# Patient Record
Sex: FEMALE | Race: WHITE | Employment: UNEMPLOYED | URBAN - METROPOLITAN AREA
[De-identification: names, ages, dates, MRNs, and addresses within clinical notes are randomized per-mention and may not be internally consistent; named-entity substitution may affect disease eponyms.]

---

## 2017-09-25 ENCOUNTER — HOSPITAL ENCOUNTER (OUTPATIENT)
Dept: WOUND CARE | Age: 37
Discharge: HOME | End: 2017-09-25
Payer: MEDICAID

## 2017-09-25 PROCEDURE — 97597 DBRDMT OPN WND 1ST 20 CM/<: CPT

## 2017-09-25 RX ORDER — QUETIAPINE FUMARATE 25 MG/1
50 TABLET, FILM COATED ORAL
COMMUNITY

## 2017-09-25 RX ORDER — GABAPENTIN 300 MG/1
600 CAPSULE ORAL 3 TIMES DAILY
COMMUNITY

## 2017-09-25 RX ORDER — FLUOXETINE HYDROCHLORIDE 40 MG/1
40 CAPSULE ORAL DAILY
COMMUNITY
End: 2018-10-03

## 2017-09-25 RX ORDER — LORATADINE 10 MG/1
10 TABLET ORAL DAILY
COMMUNITY

## 2017-09-25 RX ORDER — HYDROGEN PEROXIDE 3 %
22.3 SOLUTION, NON-ORAL MISCELLANEOUS DAILY
COMMUNITY
End: 2018-10-03

## 2017-09-25 RX ORDER — TRAZODONE HYDROCHLORIDE 50 MG/1
50 TABLET ORAL
COMMUNITY

## 2017-09-25 RX ORDER — HYDROXYZINE PAMOATE 25 MG/1
50 CAPSULE ORAL
COMMUNITY

## 2017-09-25 RX ORDER — ATORVASTATIN CALCIUM 20 MG/1
20 TABLET, FILM COATED ORAL
COMMUNITY

## 2017-09-25 RX ORDER — FLUOXETINE HYDROCHLORIDE 20 MG/1
20 CAPSULE ORAL DAILY
COMMUNITY
End: 2018-10-03

## 2017-09-25 RX ORDER — TRAMADOL HYDROCHLORIDE 50 MG/1
50 TABLET ORAL
COMMUNITY

## 2017-09-25 RX ORDER — BACLOFEN 10 MG/1
20 TABLET ORAL 3 TIMES DAILY
COMMUNITY

## 2017-09-25 RX ORDER — AMOXICILLIN AND CLAVULANATE POTASSIUM 875; 125 MG/1; MG/1
1 TABLET, FILM COATED ORAL 2 TIMES DAILY
COMMUNITY
End: 2017-11-13

## 2017-09-25 RX ORDER — HYDROCHLOROTHIAZIDE 25 MG/1
25 TABLET ORAL DAILY
COMMUNITY

## 2017-09-25 RX ORDER — INSULIN DEGLUDEC 100 U/ML
100 INJECTION, SOLUTION SUBCUTANEOUS 2 TIMES DAILY
COMMUNITY
End: 2018-10-03
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
## 2017-10-02 ENCOUNTER — HOSPITAL ENCOUNTER (OUTPATIENT)
Dept: WOUND CARE | Age: 37
Discharge: HOME | End: 2017-10-02
Payer: MEDICAID

## 2017-10-02 PROCEDURE — 99212 OFFICE O/P EST SF 10 MIN: CPT

## 2017-10-03 NOTE — PROGRESS NOTES
1500 [facility] [place] [address]   WOUND CARE PROGRESS NOTE       Name:  Mu Gómez   MR#:  [id number]   :  1980   Account #:  [de-identified]        Date of Adm:  10/02/2017       SUBJECTIVE: The patient is seen in follow up for necrobiosis lipoidica   diabeticorum. She says that she has been getting by reasonably well   since the last time I saw her, having surprisingly tolerated the   Trental at a full 400mg dose three times a day. She does say that the area of   concern on her leg is still a concern in the sense that there is some   discomfort in the area that looks brown, and some pruritus in the   periwound. There have been no changes in her medications otherwise,   and no interim complaints. OBJECTIVE   GENERAL: She seems a little bit more anxious than she was the last   time through, and perhaps has a little more plethoric facies. VITAL SIGNS: Temperature 98.4, pulse 95 and regular, respiratory rate   is 18, blood pressure is 124/86. EXTREMITIES: As I examined the left lower extremity, the area that is   open is only 0.1 x 0.1 x 0.1. There are actually 2 of these, and they are   both about the same size, and practically speaking epithelialized. I think we   will continue to treat these with Rosie Vital just to allow them to   recover, I think that gentle compression would still be worthwhile. We had a chance to discuss at some length the difficulties in treating   necrobiosis. The pentoxifylline has been well tolerated, so I have   asked her to continue that, and we have opined that since it has been   almost a year since she has used clobetasol, it may be that the area   was getting worse and that just happened \"at the same time\" rather   than \"because of\" the use of the steroids.  We also discussed the   appropriate use of the steroid, that included occlusion and she is   willing to try once again to use clobetasol with Saran Wrap applied   once a day over this area. The periphery of the discoloration (brown   centrally, pink laterally, edging out to normal skin) shows some xerosis   and I think that the ointment or cream in the clobetasol will help with   that, relieving, I hope, the pruritus, whether the discomfort will be   controlled by that or not I do not know. There is a small area that   seems to be new on the more proximal leg, it was there  last week but   she is worried that it is perhaps a little larger. I have suggested using   clobetasol on that as well, but have left the option open that we might   consider intralesional steroids. She pointedly is willing to put up with   the multitude of sticks which would be required to use intralesional   steroids on the rest of this wound, but I would like to put that off for a   bit. We also should probably research the relative expenses of using   hydroxychloroquine or Prograf topically, with both of them being likely   to be  a financial strain for her, but perhaps next on the list of things to   try. I also suggested that if the ulcers heal, it might be that referral to a   dermatologist for more long term care might be an appropriate follow   up. Will see how she looks when she comes back by. I have asked her   to do so in about 2 weeks, knowing that she can get in touch with us   with any questions that might arise in the meantime.          MD Isaiah Monahan   D:  10/02/2017   17:34   T:  10/02/2017   21:24   Job #:  [id number]     Mayra More NP

## 2017-10-16 ENCOUNTER — HOSPITAL ENCOUNTER (OUTPATIENT)
Dept: WOUND CARE | Age: 37
Discharge: HOME | End: 2017-10-16
Payer: MEDICAID

## 2017-10-16 PROCEDURE — 99213 OFFICE O/P EST LOW 20 MIN: CPT

## 2017-10-17 NOTE — PROGRESS NOTES
[address], [address] [place] [address]   WOUND CARE PROGRESS NOTE       Name:  Luz Nunez   MR#:  [id number]   :  1980   Account #:  [de-identified]        Date of Adm:  10/16/2017       DATE: 10/16/2017    SUBJECTIVE: The patient is seen in followup for the breakdown of an   area of necrobiosis lipoidicum diabeticorum. There are 2 tiny open   areas on the left leg that have been causing us some concern, but the   much larger area of the left anterior shin was causing her concern and   a couple of newer spots more proximally on either shin. She says that   other than that she has been getting by reasonably well. More cogently   for our discussion she has been able to use Clobetasol with an   occlusive dressing without making this any worse, so I do not have the   worry that I had had that the Clobetasol had been in some way   counterproductive. She continues to use pentoxifylline and does not   have any difficulty with that or with the Clobetasol. Other medications   have not changed and she has been getting by really pretty well. PHYSICAL EXAMINATION   GENERAL: She is quite bright, alert and interactive. VITAL SIGNS: Include a temperature of 98.2, pulse is 95 and regular,   respiratory rate is 18, blood pressure is 120/76. EXTREMITIES: Examination of the lower extremities shows that there   are 2 tiny wounds on the left shin, one is 1 x 0.2 x 0.2, the other is just   about that same size. These have some yellow exudate in the center of   them which I believe is the [address] [place] [address] Street which is being used. The   necrobiosis itself seems to be much paler than it has been, especially   the erythema around the lateral aspects of it. There is still some   significant ectasia of the superficial blood vessels. The browner   central areas do not seem to be so tender as they were, but still have   some component of pruritus to them.       What I proposed is that we continue with what we have got. Clobetasol   with occlusive dressings, Medihoney to the 2 ulcers. Continued   pentoxifylline. She is rather aggressively interested in trying to treat this   to make sure that no new lesions appear and with that in mind, she is   specifically concerned about two small areas on the proximal left leg   and the proximal right leg. I propose that I can write a prescription for   Kenalog with the thought that a small injection of Kenalog (10 mg per   mL) might help abort the development of these lesions. Depending   upon our success with that, we could certainly consider treating the   larger area with that unless and until the topical Clobetasol is doing as   good a job. We discussed at some length the paucity and poor quality   of studies treating necrobiosis lipoidicum and she understands the   \"another whack at it\" approach to the therapy.               MD LUCY Gutierrez / Patricio Alejandre   D:  10/16/2017   22:22   T:  10/17/2017   07:02   Job #:  [id number]     Darío Govea NP

## 2017-10-23 ENCOUNTER — HOSPITAL ENCOUNTER (OUTPATIENT)
Dept: WOUND CARE | Age: 37
Discharge: HOME | End: 2017-10-23
Payer: MEDICAID

## 2017-10-23 PROCEDURE — 97597 DBRDMT OPN WND 1ST 20 CM/<: CPT

## 2017-10-24 NOTE — PROGRESS NOTES
[facility] [place] [address]   WOUND CARE PROGRESS NOTE       Name:  Deb Gutierrez   MR#:  [id number]   :  1980   Account #:  [de-identified]        Date of Adm:  10/23/2017       DATE OF SERVICE:     SUBJECTIVE: The patient is seen in followup for her necrobiosis   lipoidica diabeticorum. Since I saw her the last time, she thinks that she   has been getting by reasonably well. There has a little bit of increasing   discomfort feeling like \"the skin is being pulled apart. \" On other   accounts, there has not been much difficulty, except that when she   talked to her pharmacy, they had some difficulty with trying to supply   her Kenalog-10. Preauthorization seems to have been required, and   they need a OLMAN number, as well as my input otherwise, for the use of   this steroid. She and I discussed this at some length, and we are   agreed that we will go ahead with attempts to try to get the medication   for her. On other accounts, she is actually getting by reasonably well,   has not had any intercurrent difficulties, is eating quite well, and has   been able to control her diabetes pretty reasonably, as far as she   knows. OBJECTIVE: Her temperature is 98.5, pulse rate is mildly elevated at   97, respiratory rate is 16 and blood pressure is 131/84. As we examine the lower extremities, right and left leg both have small   areas that are mildly pink and mildly elevated, that we are believing are   early manifestations of necrobiosis. The larger wound is not measured,   but there are 2 small areas that are about 1.5 x 1 x 0.1, that remain as   \"ulcerations\" within the depth of the darker area of this necrobiosis. This has been being treated with Medihoney, without any significant   effect.  What I have proposed to the patient is that I wanted to take a   little closer look at these small ulcerated areas, because they have not   been resolving very nicely (even though I understand that the   necrobiosis is very resistant, I had thought that the ulceration aspects   of it would be more responsive). With that in mind, I used a 5 mm   curette and opened these up. The depth included a significant amount   of rather yellow/green material, that made me think that there might be   a component of infection in the depth of these wounds, despite the use   of the Medihoney. With that in mind, I proposed that we switch out to   Aquacel AG with an \"pencil eraser-sized\" piece of Aquacel in either of   the 2 wounds, the use of Clobetasol on the rest of the area that is   involved, and an occlusive dressing around that. In addition to that,   because of my worries about systemic issues, I proposed doxycycline   100 mg, 2 now and 1 twice a day, for about 7 days, so that we can   make sure that we are not dealing with an infectious component on   one hand, and to see if we can decrease the contribution of   metalloproteases on the other. I reassured her that I will get in touch   with her pharmacy and try to get the \"preauthorization\" done for the   Kenalog injection of her necrobiosis. We have once again discussed   the irregular response to the various therapies for necrobiosis, but I am   hopeful that we can try to get a calming down of this, so that she has at   least less discomfort associated with these lesions.         MD Jon Porter   D:  10/23/2017   21:14   T:  10/24/2017   09:32   Job #:  [id number]     Talita Hauser

## 2017-10-30 ENCOUNTER — HOSPITAL ENCOUNTER (OUTPATIENT)
Dept: WOUND CARE | Age: 37
Discharge: HOME | End: 2017-10-30
Payer: MEDICAID

## 2017-10-30 PROCEDURE — 74011000250 HC RX REV CODE- 250: Performed by: INTERNAL MEDICINE

## 2017-10-30 PROCEDURE — 97597 DBRDMT OPN WND 1ST 20 CM/<: CPT

## 2017-10-30 RX ORDER — LIDOCAINE HYDROCHLORIDE 20 MG/ML
JELLY TOPICAL
Status: COMPLETED | OUTPATIENT
Start: 2017-10-30 | End: 2017-10-30

## 2017-10-30 RX ADMIN — LIDOCAINE HYDROCHLORIDE 5 ML: 20 JELLY TOPICAL at 12:00

## 2017-11-01 NOTE — PROGRESS NOTES
1500 [facility] [place] [address]   WOUND CARE PROGRESS NOTE       Name:  Krystian Castellanos   MR#:  [id number]   :  1980   Account #:  [de-identified]        Date of Adm:  10/30/2017       DATE OF SERVICE: 10/30/2017    SUBJECTIVE: The patient is seen in followup for necrobiosis lipoidica   diabeticorum. She says that she has been getting by pretty well since   we saw her the last time, except that her headaches have become   more of a problem than they had been in the past. Her neurologist has   opined that increasing the pain medication may be tried first and then   may go onto a Botox injection. We had a chance to discuss that at   some length and I told her that I do not think that will interfere with her   wound healing at all. PHYSICAL EXAMINATION   GENERAL: Today, she is bright and alert. VITAL SIGNS: Her temperature is 98.2, pulse rate is 103 and regular,   respiratory rate is 20, and her blood pressure is 153/99. LEFT LEG: Shows that there is a small patch of 3 nodules on the   lateral aspect up toward the knee. There are 2 tiny ulcers about 1.3 x   0.8 x 0.1 each with a putnam base to them. The periwound is off   brown which the necrobiosis has caused, and the larger area around   that remains erythematous but not so translucent as it has been in the   Past, and does in fact look \"calmer\" perhaps than we have seen before. RIGHT LEG: has just one 0.2 cm erythematous nodule. At this stage of the game, we are continuing to use clobetasol with   occlusion. The patient is using Medihoney with a pencil-eraser size   Aquacel in each of these wounds, and we have decided that today   (since the pharmacy realized that they did not need preauthorization   for Kenalog) did go ahead with intralesional injection with permission   from the patient.  Kenalog 10 was diluted 50/50 with 1% lidocaine and   using a 25-gauge needle, I injected the area of nodules on the proximal left leg. There was 1 nodule in the right leg which was injected   and I selected the medial aspect of the overall wound to be injected   every 1 cm for 10 injections. Each of these sites received 0.2 mL of the   diluted solution. This was tolerated really quite well. Trivial bleeding at   1 site was controlled with minimal pressure, and the plan will be to   continue the clobetasol, as mentioned above. We discussed the   possibility of discomfort at these injection sites and plan for our repeat   evaluation at just about a week. PLAN: Continue injections just about weekly. If there is significant   progress, then we can expand the area that is being treated. If there is   not, we will have to \"take another tack. \" The almost \"trial and error\"   approach to the treatment of necrobiosis was discussed once again, and   the patient understands the goal is to control the lesion, not to   eradicate it.          MD LUCY De La Garza / Rafal Forrest   D:  10/31/2017   11:02   T:  10/31/2017   16:53   Job #:  [id number]     Stephen Cheahtam NP

## 2017-11-06 ENCOUNTER — HOSPITAL ENCOUNTER (OUTPATIENT)
Dept: WOUND CARE | Age: 37
Discharge: HOME | End: 2017-11-06
Payer: MEDICAID

## 2017-11-06 PROCEDURE — 74011000250 HC RX REV CODE- 250: Performed by: INTERNAL MEDICINE

## 2017-11-06 PROCEDURE — 99213 OFFICE O/P EST LOW 20 MIN: CPT

## 2017-11-06 RX ORDER — TOPIRAMATE 100 MG/1
100 TABLET, FILM COATED ORAL 2 TIMES DAILY
COMMUNITY

## 2017-11-06 RX ORDER — LIDOCAINE HYDROCHLORIDE 20 MG/ML
JELLY TOPICAL
Status: COMPLETED | OUTPATIENT
Start: 2017-11-06 | End: 2017-11-06

## 2017-11-06 RX ADMIN — LIDOCAINE HYDROCHLORIDE 5 ML: 20 JELLY TOPICAL at 11:00

## 2017-11-07 RX ORDER — PENTOXIFYLLINE 400 MG/1
400 TABLET, EXTENDED RELEASE ORAL
COMMUNITY
End: 2018-10-03

## 2017-11-07 NOTE — PROGRESS NOTES
1500 [facility] [place] [address]   WOUND CARE PROGRESS NOTE       Name:  Autumn Zhou   MR#:  [id number]   :  1980   Account #:  [de-identified]        Date of Adm:  2017       DATE OF SERVICE: 2017    SUBJECTIVE: The patient is seen in followup for necrobiosis lipoidica   diabeticorum, with a couple of ulcers on the left shin. She says that she   has been getting by pretty well since we saw her the last time, and is   really quite pleased with the fact that she can now see where there are   \"needle sticks, but the redness is gone. \" The difficulty, however, is that   she and her daughter have \"lost\" the Kenalog and the syringes that we   were using for her treatment the last time through. On other accounts,   she is doing pretty well, but there is a question about a couple of   sebaceous cysts that Ms. Domingo Kelly had asked if we could address. I   pointed out that that is a little bit more aggressive surgery than we can   do at the clinic here, and I have suggested that family practice or    general surgery might have a better approach to that. On   other accounts, things have not changed very much. There are no   intercurrent illnesses, and she is getting by pretty well. OBJECTIVE; Shows her to be quite bright, alert and interactive. She   seems quite cheerful about the improvement in her necrobiosis. Her   vital signs include a temperature of 98.4, pulse 100 and regular,   respiratory rate is 20, blood pressure is 139/99. Examination of the lower extremities shows that there is the small   lesion of the right leg, which is significantly better after injection. Her proximal  lesion of the left leg is pretty much unchanged. The wound that has   been causing us so much difficulty is the left lateral lower leg.  There are   only 2 very small areas, 1 x 0.5 x 0.1, that are still open, but   surrounded by a significant area of brown and a periphery of red necrobiosis. The open areas seem to be significantly smaller than they   have been in the past. What we have proposed is that we will continue   pentoxifylline on our previous regimen, the continuation of clobetasol   with an occlusive dressing, and refill of her Kenalog 10%, with the   thought that we will actually be more aggressive at \"turning her into a   pin cushion,\" in terms of intralesional injection of this lower extremity,   since she thinks she has got significant improvement in response to   our therapy last week. I have pointed out that this may require every   week or every other week injection. She is perfectly willing to put up   with that discomfort, and I have given her a prescription for the   medication for her to bring back the next time so that we can see   where we stand. At her request, I used a 5mm currette to remove the residual medihoney   and aquacel from her small ulcers. She knows she can get in touch with us if any   questions arise in the meantime. Otherwise, we will see her back in just   about a week's time.         MD Bruna Betancourt   D:  11/06/2017   20:38   T:  11/07/2017   08:32   Job #:  [id number]     Trung King NP

## 2017-11-13 ENCOUNTER — HOSPITAL ENCOUNTER (OUTPATIENT)
Dept: WOUND CARE | Age: 37
Discharge: HOME | End: 2017-11-13
Payer: MEDICAID

## 2017-11-13 PROCEDURE — 99213 OFFICE O/P EST LOW 20 MIN: CPT

## 2017-11-14 NOTE — PROGRESS NOTES
[address], [address] [place] [address]   WOUND CARE PROGRESS NOTE       Name:  Romelia Bains   MR#:  [id number]   :  1980   Account #:  [de-identified]        Date of Adm:  2017       DATE OF SERVICE: 2017    SUBJECTIVE: The patient is seen in followup for her necrobiosis   lipoidica diabeticorum. She says that she has been getting by   reasonably well since the last time I saw her, but she has had no   success in getting permission from her insurance company to use   Kenalog 10 injections. This had involved precertification for her   previous insurance company \"which she has changed\" and resistance   on the part of her new insurance company. Be that as it may, she   believes that she is getting by reasonably well, using pentoxifylline 3   times a day and clobetasol with a saran wrap covering. On other   accounts, aside from insurance-mandated med changes, she feels OK. OBJECTIVE   GENERAL: On physical exam today, she is quite bright, alert and   interactive. VITAL SIGNS: Pulse rate 114 and regular (she is mildly agitated),   respiratory rate is 20 and her blood pressure is 135/91. LOWER EXTREMITIES: Examination of the lower extremities shows   that there are 2 very small lesions which we have seen before. The   total is about 1 x 0.8 x 0.1, and they are not nearly so \"punched out\" as   they were in the past. They have been being treated with Medihoney   and Aquacel AG, as well as an injection about 2 weeks ago with   Kenalog. This being the case, we have decided what we will undertake is to   continue therapy with clobetasol and pentoxifylline. We will attempt to   re-establish permission to use Kenalog intralesionally and we will try to   decide where to go at that time.  The patient understands on the one   hand the \"trial and error\" approach with intralesional Kenalog and on   the other hand, the difficulty that we are having negotiating with her insurance company. She will be back in just about 2 weeks. There is an appointment in 1   week in case we get permission for the Kenalog earlier.          MD LUCY Gutierrez / DENZEL   D:  11/13/2017   19:56   T:  11/14/2017   06:03   Job #:  [id number]     Darío Govea NP

## 2018-06-18 ENCOUNTER — IP HISTORICAL/CONVERTED ENCOUNTER (OUTPATIENT)
Dept: OTHER | Age: 38
End: 2018-06-18

## 2018-10-03 ENCOUNTER — HOSPITAL ENCOUNTER (INPATIENT)
Age: 38
LOS: 6 days | Discharge: HOME | DRG: 071 | End: 2018-10-09
Attending: EMERGENCY MEDICINE | Admitting: FAMILY MEDICINE
Payer: SELF-PAY

## 2018-10-03 ENCOUNTER — APPOINTMENT (OUTPATIENT)
Dept: GENERAL RADIOLOGY | Age: 38
DRG: 071 | End: 2018-10-03
Attending: EMERGENCY MEDICINE
Payer: SELF-PAY

## 2018-10-03 ENCOUNTER — APPOINTMENT (OUTPATIENT)
Dept: CT IMAGING | Age: 38
DRG: 071 | End: 2018-10-03
Attending: EMERGENCY MEDICINE
Payer: SELF-PAY

## 2018-10-03 DIAGNOSIS — R41.82 ALTERED MENTAL STATUS, UNSPECIFIED ALTERED MENTAL STATUS TYPE: Primary | ICD-10-CM

## 2018-10-03 DIAGNOSIS — D72.829 LEUKOCYTOSIS, UNSPECIFIED TYPE: ICD-10-CM

## 2018-10-03 DIAGNOSIS — R20.0 RIGHT SIDED NUMBNESS: ICD-10-CM

## 2018-10-03 DIAGNOSIS — R53.1 RIGHT SIDED WEAKNESS: ICD-10-CM
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
## 2018-10-03 LAB
ALBUMIN SERPL-MCNC: 4.5 G/DL (ref 3.5–5)
ALBUMIN/GLOB SERPL: 1.2 {RATIO} (ref 1.1–2.2)
ALP SERPL-CCNC: 135 U/L (ref 45–117)
ALT SERPL-CCNC: 43 U/L (ref 12–78)
AMPHET UR QL SCN: NEGATIVE
ANION GAP SERPL CALC-SCNC: 8 MMOL/L (ref 5–15)
APAP SERPL-MCNC: <2 UG/ML (ref 10–30)
APPEARANCE UR: ABNORMAL
AST SERPL-CCNC: 29 U/L (ref 15–37)
BACTERIA URNS QL MICRO: ABNORMAL /HPF
BARBITURATES UR QL SCN: NEGATIVE
BASOPHILS # BLD: 0.1 K/UL (ref 0–0.1)
BASOPHILS NFR BLD: 1 % (ref 0–1)
BENZODIAZ UR QL: NEGATIVE
BILIRUB SERPL-MCNC: 0.4 MG/DL (ref 0.2–1)
BILIRUB UR QL CFM: NEGATIVE
BUN SERPL-MCNC: 8 MG/DL (ref 6–20)
BUN/CREAT SERPL: 9 (ref 12–20)
CALCIUM SERPL-MCNC: 9 MG/DL (ref 8.5–10.1)
CANNABINOIDS UR QL SCN: NEGATIVE
CHLORIDE SERPL-SCNC: 107 MMOL/L (ref 97–108)
CK MB CFR SERPL CALC: 6.1 % (ref 0–2.5)
CK MB SERPL-MCNC: 6.2 NG/ML (ref 5–25)
CK SERPL-CCNC: 102 U/L (ref 26–192)
CO2 SERPL-SCNC: 23 MMOL/L (ref 21–32)
COCAINE UR QL SCN: NEGATIVE
COLOR UR: ABNORMAL
COMMENT, HOLDF: NORMAL
CREAT SERPL-MCNC: 0.94 MG/DL (ref 0.55–1.02)
DIFFERENTIAL METHOD BLD: ABNORMAL
DRUG SCRN COMMENT,DRGCM: ABNORMAL
EOSINOPHIL # BLD: 0.2 K/UL (ref 0–0.4)
EOSINOPHIL NFR BLD: 1 % (ref 0–7)
EPITH CASTS URNS QL MICRO: ABNORMAL /LPF
ERYTHROCYTE [DISTWIDTH] IN BLOOD BY AUTOMATED COUNT: 13.2 % (ref 11.5–14.5)
ETHANOL SERPL-MCNC: <10 MG/DL
GLOBULIN SER CALC-MCNC: 3.7 G/DL (ref 2–4)
GLUCOSE SERPL-MCNC: 185 MG/DL (ref 65–100)
GLUCOSE UR STRIP.AUTO-MCNC: NEGATIVE MG/DL
HCG UR QL: NEGATIVE
HCT VFR BLD AUTO: 43.1 % (ref 35–47)
HGB BLD-MCNC: 14.4 G/DL (ref 11.5–16)
HGB UR QL STRIP: NEGATIVE
HYALINE CASTS URNS QL MICRO: ABNORMAL /LPF (ref 0–5)
IMM GRANULOCYTES # BLD: 0.1 K/UL (ref 0–0.04)
IMM GRANULOCYTES NFR BLD AUTO: 1 % (ref 0–0.5)
KETONES UR QL STRIP.AUTO: ABNORMAL MG/DL
LEUKOCYTE ESTERASE UR QL STRIP.AUTO: ABNORMAL
LYMPHOCYTES # BLD: 3.6 K/UL (ref 0.8–3.5)
LYMPHOCYTES NFR BLD: 27 % (ref 12–49)
MCH RBC QN AUTO: 31.2 PG (ref 26–34)
MCHC RBC AUTO-ENTMCNC: 33.4 G/DL (ref 30–36.5)
MCV RBC AUTO: 93.5 FL (ref 80–99)
METHADONE UR QL: NEGATIVE
MONOCYTES # BLD: 1.2 K/UL (ref 0–1)
MONOCYTES NFR BLD: 9 % (ref 5–13)
NEUTS SEG # BLD: 8.1 K/UL (ref 1.8–8)
NEUTS SEG NFR BLD: 61 % (ref 32–75)
NITRITE UR QL STRIP.AUTO: NEGATIVE
NRBC # BLD: 0 K/UL (ref 0–0.01)
NRBC BLD-RTO: 0 PER 100 WBC
OPIATES UR QL: POSITIVE
PCP UR QL: NEGATIVE
PH UR STRIP: 6 [PH] (ref 5–8)
PLATELET # BLD AUTO: 315 K/UL (ref 150–400)
PMV BLD AUTO: 11 FL (ref 8.9–12.9)
POTASSIUM SERPL-SCNC: 4.2 MMOL/L (ref 3.5–5.1)
PROT SERPL-MCNC: 8.2 G/DL (ref 6.4–8.2)
PROT UR STRIP-MCNC: 30 MG/DL
RBC # BLD AUTO: 4.61 M/UL (ref 3.8–5.2)
RBC #/AREA URNS HPF: ABNORMAL /HPF (ref 0–5)
SALICYLATES SERPL-MCNC: 5.6 MG/DL (ref 2.8–20)
SAMPLES BEING HELD,HOLD: NORMAL
SODIUM SERPL-SCNC: 138 MMOL/L (ref 136–145)
SP GR UR REFRACTOMETRY: 1.03 (ref 1–1.03)
TROPONIN I SERPL-MCNC: <0.05 NG/ML
UR CULT HOLD, URHOLD: NORMAL
UROBILINOGEN UR QL STRIP.AUTO: 0.2 EU/DL (ref 0.2–1)
WBC # BLD AUTO: 13.3 K/UL (ref 3.6–11)
WBC URNS QL MICRO: ABNORMAL /HPF (ref 0–4)

## 2018-10-03 PROCEDURE — 74011250636 HC RX REV CODE- 250/636: Performed by: EMERGENCY MEDICINE

## 2018-10-03 PROCEDURE — 70450 CT HEAD/BRAIN W/O DYE: CPT

## 2018-10-03 PROCEDURE — 82550 ASSAY OF CK (CPK): CPT | Performed by: EMERGENCY MEDICINE

## 2018-10-03 PROCEDURE — 81001 URINALYSIS AUTO W/SCOPE: CPT | Performed by: EMERGENCY MEDICINE

## 2018-10-03 PROCEDURE — 80307 DRUG TEST PRSMV CHEM ANLYZR: CPT | Performed by: EMERGENCY MEDICINE

## 2018-10-03 PROCEDURE — 84484 ASSAY OF TROPONIN QUANT: CPT | Performed by: EMERGENCY MEDICINE

## 2018-10-03 PROCEDURE — 81025 URINE PREGNANCY TEST: CPT

## 2018-10-03 PROCEDURE — 96374 THER/PROPH/DIAG INJ IV PUSH: CPT

## 2018-10-03 PROCEDURE — 65610000006 HC RM INTENSIVE CARE

## 2018-10-03 PROCEDURE — 80053 COMPREHEN METABOLIC PANEL: CPT | Performed by: EMERGENCY MEDICINE

## 2018-10-03 PROCEDURE — 72125 CT NECK SPINE W/O DYE: CPT

## 2018-10-03 PROCEDURE — 93005 ELECTROCARDIOGRAM TRACING: CPT

## 2018-10-03 PROCEDURE — 84484 ASSAY OF TROPONIN QUANT: CPT | Performed by: FAMILY MEDICINE

## 2018-10-03 PROCEDURE — 85025 COMPLETE CBC W/AUTO DIFF WBC: CPT | Performed by: EMERGENCY MEDICINE

## 2018-10-03 PROCEDURE — 71045 X-RAY EXAM CHEST 1 VIEW: CPT

## 2018-10-03 PROCEDURE — 84443 ASSAY THYROID STIM HORMONE: CPT | Performed by: FAMILY MEDICINE

## 2018-10-03 PROCEDURE — 99285 EMERGENCY DEPT VISIT HI MDM: CPT

## 2018-10-03 PROCEDURE — 36415 COLL VENOUS BLD VENIPUNCTURE: CPT | Performed by: EMERGENCY MEDICINE

## 2018-10-03 RX ORDER — NALOXONE HYDROCHLORIDE 1 MG/ML
1 INJECTION INTRAMUSCULAR; INTRAVENOUS; SUBCUTANEOUS
Status: COMPLETED | OUTPATIENT
Start: 2018-10-03 | End: 2018-10-03

## 2018-10-03 RX ADMIN — NALOXONE HYDROCHLORIDE 1 MG: 1 INJECTION PARENTERAL at 22:28
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
## 2018-10-04 ENCOUNTER — APPOINTMENT (OUTPATIENT)
Dept: MRI IMAGING | Age: 38
DRG: 071 | End: 2018-10-04
Attending: PSYCHIATRY & NEUROLOGY
Payer: SELF-PAY

## 2018-10-04 LAB
ANION GAP SERPL CALC-SCNC: 7 MMOL/L (ref 5–15)
ATRIAL RATE: 95 BPM
BASOPHILS # BLD: 0.1 K/UL (ref 0–0.1)
BASOPHILS NFR BLD: 1 % (ref 0–1)
BUN SERPL-MCNC: 8 MG/DL (ref 6–20)
BUN/CREAT SERPL: 10 (ref 12–20)
CALCIUM SERPL-MCNC: 8.8 MG/DL (ref 8.5–10.1)
CALCULATED P AXIS, ECG09: 41 DEGREES
CALCULATED R AXIS, ECG10: 82 DEGREES
CALCULATED T AXIS, ECG11: 48 DEGREES
CHLORIDE SERPL-SCNC: 108 MMOL/L (ref 97–108)
CO2 SERPL-SCNC: 24 MMOL/L (ref 21–32)
CREAT SERPL-MCNC: 0.81 MG/DL (ref 0.55–1.02)
DIAGNOSIS, 93000: NORMAL
DIFFERENTIAL METHOD BLD: ABNORMAL
EOSINOPHIL # BLD: 0.3 K/UL (ref 0–0.4)
EOSINOPHIL NFR BLD: 2 % (ref 0–7)
ERYTHROCYTE [DISTWIDTH] IN BLOOD BY AUTOMATED COUNT: 13.2 % (ref 11.5–14.5)
EST. AVERAGE GLUCOSE BLD GHB EST-MCNC: 180 MG/DL
GLUCOSE BLD STRIP.AUTO-MCNC: 139 MG/DL (ref 65–100)
GLUCOSE BLD STRIP.AUTO-MCNC: 174 MG/DL (ref 65–100)
GLUCOSE BLD STRIP.AUTO-MCNC: 273 MG/DL (ref 65–100)
GLUCOSE SERPL-MCNC: 156 MG/DL (ref 65–100)
HBA1C MFR BLD: 7.9 % (ref 4.2–6.3)
HCT VFR BLD AUTO: 42.3 % (ref 35–47)
HGB BLD-MCNC: 14 G/DL (ref 11.5–16)
IMM GRANULOCYTES # BLD: 0.1 K/UL (ref 0–0.04)
IMM GRANULOCYTES NFR BLD AUTO: 0 % (ref 0–0.5)
LYMPHOCYTES # BLD: 4.3 K/UL (ref 0.8–3.5)
LYMPHOCYTES NFR BLD: 36 % (ref 12–49)
MAGNESIUM SERPL-MCNC: 1.9 MG/DL (ref 1.6–2.4)
MCH RBC QN AUTO: 31.5 PG (ref 26–34)
MCHC RBC AUTO-ENTMCNC: 33.1 G/DL (ref 30–36.5)
MCV RBC AUTO: 95.1 FL (ref 80–99)
MONOCYTES # BLD: 1.3 K/UL (ref 0–1)
MONOCYTES NFR BLD: 11 % (ref 5–13)
NEUTS SEG # BLD: 5.8 K/UL (ref 1.8–8)
NEUTS SEG NFR BLD: 49 % (ref 32–75)
NRBC # BLD: 0.05 K/UL (ref 0–0.01)
NRBC BLD-RTO: 0.4 PER 100 WBC
P-R INTERVAL, ECG05: 138 MS
PHOSPHATE SERPL-MCNC: 3 MG/DL (ref 2.6–4.7)
PLATELET # BLD AUTO: 304 K/UL (ref 150–400)
PMV BLD AUTO: 11.4 FL (ref 8.9–12.9)
POTASSIUM SERPL-SCNC: 4.7 MMOL/L (ref 3.5–5.1)
Q-T INTERVAL, ECG07: 360 MS
QRS DURATION, ECG06: 86 MS
QTC CALCULATION (BEZET), ECG08: 452 MS
RBC # BLD AUTO: 4.45 M/UL (ref 3.8–5.2)
SERVICE CMNT-IMP: ABNORMAL
SODIUM SERPL-SCNC: 139 MMOL/L (ref 136–145)
TROPONIN I SERPL-MCNC: <0.05 NG/ML
TSH SERPL DL<=0.05 MIU/L-ACNC: 1.88 UIU/ML (ref 0.36–3.74)
VENTRICULAR RATE, ECG03: 95 BPM
WBC # BLD AUTO: 11.8 K/UL (ref 3.6–11)

## 2018-10-04 PROCEDURE — 83735 ASSAY OF MAGNESIUM: CPT | Performed by: FAMILY MEDICINE

## 2018-10-04 PROCEDURE — 74011250636 HC RX REV CODE- 250/636: Performed by: HOSPITALIST

## 2018-10-04 PROCEDURE — 82962 GLUCOSE BLOOD TEST: CPT

## 2018-10-04 PROCEDURE — 36415 COLL VENOUS BLD VENIPUNCTURE: CPT | Performed by: FAMILY MEDICINE

## 2018-10-04 PROCEDURE — 85025 COMPLETE CBC W/AUTO DIFF WBC: CPT | Performed by: FAMILY MEDICINE

## 2018-10-04 PROCEDURE — 95816 EEG AWAKE AND DROWSY: CPT | Performed by: PSYCHIATRY & NEUROLOGY

## 2018-10-04 PROCEDURE — 74011250637 HC RX REV CODE- 250/637: Performed by: HOSPITALIST

## 2018-10-04 PROCEDURE — 94760 N-INVAS EAR/PLS OXIMETRY 1: CPT

## 2018-10-04 PROCEDURE — 70551 MRI BRAIN STEM W/O DYE: CPT

## 2018-10-04 PROCEDURE — 80048 BASIC METABOLIC PNL TOTAL CA: CPT | Performed by: FAMILY MEDICINE

## 2018-10-04 PROCEDURE — 83036 HEMOGLOBIN GLYCOSYLATED A1C: CPT | Performed by: HOSPITALIST

## 2018-10-04 PROCEDURE — 51798 US URINE CAPACITY MEASURE: CPT

## 2018-10-04 PROCEDURE — 65660000000 HC RM CCU STEPDOWN

## 2018-10-04 PROCEDURE — 74011636637 HC RX REV CODE- 636/637: Performed by: HOSPITALIST

## 2018-10-04 PROCEDURE — 84100 ASSAY OF PHOSPHORUS: CPT | Performed by: FAMILY MEDICINE

## 2018-10-04 RX ORDER — LORAZEPAM 2 MG/ML
0.5 INJECTION INTRAMUSCULAR
Status: COMPLETED | OUTPATIENT
Start: 2018-10-04 | End: 2018-10-04

## 2018-10-04 RX ORDER — TRAMADOL HYDROCHLORIDE 50 MG/1
50 TABLET ORAL
Status: DISCONTINUED | OUTPATIENT
Start: 2018-10-04 | End: 2018-10-09 | Stop reason: HOSPADM

## 2018-10-04 RX ORDER — INSULIN GLARGINE 100 [IU]/ML
20 INJECTION, SOLUTION SUBCUTANEOUS
COMMUNITY

## 2018-10-04 RX ORDER — DULOXETIN HYDROCHLORIDE 30 MG/1
90 CAPSULE, DELAYED RELEASE ORAL DAILY
COMMUNITY

## 2018-10-04 RX ORDER — QUETIAPINE FUMARATE 25 MG/1
50 TABLET, FILM COATED ORAL
Status: DISCONTINUED | OUTPATIENT
Start: 2018-10-04 | End: 2018-10-09 | Stop reason: HOSPADM

## 2018-10-04 RX ORDER — GABAPENTIN 300 MG/1
600 CAPSULE ORAL 3 TIMES DAILY
Status: DISCONTINUED | OUTPATIENT
Start: 2018-10-04 | End: 2018-10-09 | Stop reason: HOSPADM

## 2018-10-04 RX ORDER — MAGNESIUM SULFATE 100 %
4 CRYSTALS MISCELLANEOUS AS NEEDED
Status: DISCONTINUED | OUTPATIENT
Start: 2018-10-04 | End: 2018-10-09 | Stop reason: HOSPADM

## 2018-10-04 RX ORDER — DEXTROSE 50 % IN WATER (D50W) INTRAVENOUS SYRINGE
25-50 AS NEEDED
Status: DISCONTINUED | OUTPATIENT
Start: 2018-10-04 | End: 2018-10-09 | Stop reason: HOSPADM

## 2018-10-04 RX ORDER — INSULIN GLARGINE 100 [IU]/ML
20 INJECTION, SOLUTION SUBCUTANEOUS
Status: DISCONTINUED | OUTPATIENT
Start: 2018-10-04 | End: 2018-10-09 | Stop reason: HOSPADM

## 2018-10-04 RX ORDER — TRIAMCINOLONE ACETONIDE 1 MG/G
OINTMENT TOPICAL 2 TIMES DAILY
Status: DISCONTINUED | OUTPATIENT
Start: 2018-10-04 | End: 2018-10-09 | Stop reason: HOSPADM

## 2018-10-04 RX ORDER — INSULIN LISPRO 100 [IU]/ML
INJECTION, SOLUTION INTRAVENOUS; SUBCUTANEOUS
Status: DISCONTINUED | OUTPATIENT
Start: 2018-10-04 | End: 2018-10-04

## 2018-10-04 RX ORDER — INSULIN LISPRO 100 [IU]/ML
INJECTION, SOLUTION INTRAVENOUS; SUBCUTANEOUS
Status: DISCONTINUED | OUTPATIENT
Start: 2018-10-04 | End: 2018-10-09 | Stop reason: HOSPADM

## 2018-10-04 RX ORDER — ENOXAPARIN SODIUM 100 MG/ML
40 INJECTION SUBCUTANEOUS EVERY 24 HOURS
Status: DISCONTINUED | OUTPATIENT
Start: 2018-10-04 | End: 2018-10-09 | Stop reason: HOSPADM

## 2018-10-04 RX ORDER — SODIUM CHLORIDE 0.9 % (FLUSH) 0.9 %
5-10 SYRINGE (ML) INJECTION EVERY 8 HOURS
Status: DISCONTINUED | OUTPATIENT
Start: 2018-10-04 | End: 2018-10-09 | Stop reason: HOSPADM

## 2018-10-04 RX ORDER — SODIUM CHLORIDE 0.9 % (FLUSH) 0.9 %
5-10 SYRINGE (ML) INJECTION AS NEEDED
Status: DISCONTINUED | OUTPATIENT
Start: 2018-10-04 | End: 2018-10-09 | Stop reason: HOSPADM

## 2018-10-04 RX ORDER — TOPIRAMATE 100 MG/1
100 TABLET, FILM COATED ORAL 2 TIMES DAILY
Status: DISCONTINUED | OUTPATIENT
Start: 2018-10-04 | End: 2018-10-09 | Stop reason: HOSPADM

## 2018-10-04 RX ORDER — BUPRENORPHINE AND NALOXONE 8; 2 MG/1; MG/1
1 FILM, SOLUBLE BUCCAL; SUBLINGUAL 2 TIMES DAILY
COMMUNITY

## 2018-10-04 RX ORDER — ACETAMINOPHEN 325 MG/1
650 TABLET ORAL
Status: DISCONTINUED | OUTPATIENT
Start: 2018-10-04 | End: 2018-10-09 | Stop reason: HOSPADM

## 2018-10-04 RX ADMIN — Medication 10 ML: at 08:35

## 2018-10-04 RX ADMIN — ENOXAPARIN SODIUM 40 MG: 40 INJECTION SUBCUTANEOUS at 17:21

## 2018-10-04 RX ADMIN — Medication 10 ML: at 23:38

## 2018-10-04 RX ADMIN — GABAPENTIN 600 MG: 300 CAPSULE ORAL at 23:35

## 2018-10-04 RX ADMIN — QUETIAPINE FUMARATE 50 MG: 25 TABLET ORAL at 23:35

## 2018-10-04 RX ADMIN — Medication 10 ML: at 17:24

## 2018-10-04 RX ADMIN — ACETAMINOPHEN 650 MG: 325 TABLET ORAL at 14:29

## 2018-10-04 RX ADMIN — LORAZEPAM 0.5 MG: 2 INJECTION INTRAMUSCULAR; INTRAVENOUS at 20:50

## 2018-10-04 RX ADMIN — INSULIN LISPRO 2 UNITS: 100 INJECTION, SOLUTION INTRAVENOUS; SUBCUTANEOUS at 17:21

## 2018-10-04 RX ADMIN — TRAMADOL HYDROCHLORIDE 50 MG: 50 TABLET, FILM COATED ORAL at 17:20

## 2018-10-04 RX ADMIN — TOPIRAMATE 100 MG: 100 TABLET, FILM COATED ORAL at 17:20

## 2018-10-04 RX ADMIN — GABAPENTIN 600 MG: 300 CAPSULE ORAL at 17:20

## 2018-10-04 RX ADMIN — LORAZEPAM 0.5 MG: 2 INJECTION INTRAMUSCULAR; INTRAVENOUS at 21:39

## 2018-10-04 RX ADMIN — TRIAMCINOLONE ACETONIDE: 1 OINTMENT TOPICAL at 18:09

## 2018-10-04 RX ADMIN — INSULIN LISPRO 5 UNITS: 100 INJECTION, SOLUTION INTRAVENOUS; SUBCUTANEOUS at 14:30

## 2018-10-04 RX ADMIN — INSULIN GLARGINE 20 UNITS: 100 INJECTION, SOLUTION SUBCUTANEOUS at 23:36

## 2018-10-04 NOTE — ED NOTES
TRANSFER - OUT REPORT: 
 
Verbal report given to [address] [facility] (name) on Toshia Romero  being transferred to ICU (unit) for routine progression of care Report consisted of patients Situation, Background, Assessment and  
Recommendations(SBAR). Information from the following report(s) SBAR, ED Summary, Intake/Output, MAR and Recent Results was reviewed with the receiving nurse. Lines:  
Peripheral IV 10/03/18 Right Hand (Active) Site Assessment Clean, dry, & intact 10/3/2018 10:25 PM  
Phlebitis Assessment 0 10/3/2018 10:25 PM  
Infiltration Assessment 0 10/3/2018 10:25 PM  
Dressing Status Clean, dry, & intact 10/3/2018 10:25 PM  
Dressing Type Topical skin adhesive;Transparent 10/3/2018 10:25 PM  
Hub Color/Line Status Pink;Capped;Flushed;Patent 10/3/2018 10:25 PM  
Action Taken Blood drawn 10/3/2018 10:25 PM  
  
 
Opportunity for questions and clarification was provided. Patient transported with: 
 Monitor
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
## 2018-10-04 NOTE — PROGRESS NOTES
Bedside and Verbal shift change report given to Sheyla RN (oncoming nurse) by Karo Ames RN (offgoing nurse). Report included the following information SBAR, Kardex, Intake/Output, MAR, Recent Results, Cardiac Rhythm NSR and Alarm Parameters .

## 2018-10-04 NOTE — CONSULTS
Neuro consult completed. Dictated note to follow. Suspected psych related, but on exam pt reporting dec PP in right F/A/L and with possible right sided weakness with functional overlay inhibiting accurate assessment. EEG has been ordered by hospitalist.  I will order MRI brain wo. She can be transferred to floor in my opinion.
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
## 2018-10-04 NOTE — ED NOTES
Dr. Rodney Mckinley spoke with poison center,  supportive care recommended. Poison center will call back for labs. Will continue to monitor.

## 2018-10-04 NOTE — ED NOTES
Patient resting quietly in stretcher, VSS, respirations even and unlabored and in no acute distress upon transfer to the floor. Transferred with RN on monitor.

## 2018-10-04 NOTE — ED NOTES
1mg of IV narcan given per MD order. Emesis bag at bedside, patient sitting up in bed. No change in mental status noted. Will continue to monitor.
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
## 2018-10-04 NOTE — PROGRESS NOTES
Problem: Falls - Risk of 
Goal: *Absence of Falls Document April Ryland Fall Risk and appropriate interventions in the flowsheet. Outcome: Progressing Towards Goal 
Fall Risk Interventions: 
Mobility Interventions: Patient to call before getting OOB Mentation Interventions: Adequate sleep, hydration, pain control, Evaluate medications/consider consulting pharmacy Elimination Interventions: Call light in reach, Toileting schedule/hourly rounds

## 2018-10-04 NOTE — PROGRESS NOTES
PCCM 
 
CC: AMS 
chart reviewed Pt discussed on multi-D rounds Appears stable medically Neurology to see UDS + opiates EKG w/o abnml Imaging negative acute findings Has polypharmacy w psych isues 
 
available a needed

## 2018-10-04 NOTE — ED NOTES
Assumed care of patient at this time. Patient arousable by voice. A&Ox1 after repeated questioning. VSS. Bed locked in low position, call bell within reach, door open with patient within view of nurses station.
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
## 2018-10-05 LAB
GLUCOSE BLD STRIP.AUTO-MCNC: 145 MG/DL (ref 65–100)
GLUCOSE BLD STRIP.AUTO-MCNC: 158 MG/DL (ref 65–100)
GLUCOSE BLD STRIP.AUTO-MCNC: 192 MG/DL (ref 65–100)
GLUCOSE BLD STRIP.AUTO-MCNC: 251 MG/DL (ref 65–100)
SERVICE CMNT-IMP: ABNORMAL

## 2018-10-05 PROCEDURE — 74011636637 HC RX REV CODE- 636/637: Performed by: HOSPITALIST

## 2018-10-05 PROCEDURE — 97530 THERAPEUTIC ACTIVITIES: CPT

## 2018-10-05 PROCEDURE — 74011250637 HC RX REV CODE- 250/637: Performed by: HOSPITALIST

## 2018-10-05 PROCEDURE — 97165 OT EVAL LOW COMPLEX 30 MIN: CPT

## 2018-10-05 PROCEDURE — G8978 MOBILITY CURRENT STATUS: HCPCS

## 2018-10-05 PROCEDURE — 74011250636 HC RX REV CODE- 250/636: Performed by: HOSPITALIST

## 2018-10-05 PROCEDURE — G8979 MOBILITY GOAL STATUS: HCPCS

## 2018-10-05 PROCEDURE — 82962 GLUCOSE BLOOD TEST: CPT

## 2018-10-05 PROCEDURE — 97161 PT EVAL LOW COMPLEX 20 MIN: CPT

## 2018-10-05 PROCEDURE — 65660000000 HC RM CCU STEPDOWN

## 2018-10-05 RX ADMIN — GABAPENTIN 600 MG: 300 CAPSULE ORAL at 23:00

## 2018-10-05 RX ADMIN — INSULIN LISPRO 5 UNITS: 100 INJECTION, SOLUTION INTRAVENOUS; SUBCUTANEOUS at 12:57

## 2018-10-05 RX ADMIN — TRAMADOL HYDROCHLORIDE 50 MG: 50 TABLET, FILM COATED ORAL at 10:38

## 2018-10-05 RX ADMIN — INSULIN LISPRO 2 UNITS: 100 INJECTION, SOLUTION INTRAVENOUS; SUBCUTANEOUS at 09:01

## 2018-10-05 RX ADMIN — TOPIRAMATE 100 MG: 100 TABLET, FILM COATED ORAL at 17:04

## 2018-10-05 RX ADMIN — TRIAMCINOLONE ACETONIDE: 1 OINTMENT TOPICAL at 17:08

## 2018-10-05 RX ADMIN — GABAPENTIN 600 MG: 300 CAPSULE ORAL at 17:04

## 2018-10-05 RX ADMIN — TOPIRAMATE 100 MG: 100 TABLET, FILM COATED ORAL at 09:01

## 2018-10-05 RX ADMIN — ACETAMINOPHEN 650 MG: 325 TABLET ORAL at 09:45

## 2018-10-05 RX ADMIN — INSULIN GLARGINE 20 UNITS: 100 INJECTION, SOLUTION SUBCUTANEOUS at 23:00

## 2018-10-05 RX ADMIN — ENOXAPARIN SODIUM 40 MG: 40 INJECTION SUBCUTANEOUS at 17:04

## 2018-10-05 RX ADMIN — GABAPENTIN 600 MG: 300 CAPSULE ORAL at 09:01

## 2018-10-05 RX ADMIN — Medication 10 ML: at 06:06

## 2018-10-05 RX ADMIN — QUETIAPINE FUMARATE 50 MG: 25 TABLET ORAL at 23:00

## 2018-10-05 RX ADMIN — Medication 10 ML: at 23:03

## 2018-10-05 RX ADMIN — Medication 10 ML: at 14:01

## 2018-10-05 RX ADMIN — TRIAMCINOLONE ACETONIDE: 1 OINTMENT TOPICAL at 09:04

## 2018-10-05 RX ADMIN — ACETAMINOPHEN 650 MG: 325 TABLET ORAL at 19:56

## 2018-10-05 RX ADMIN — INSULIN LISPRO 2 UNITS: 100 INJECTION, SOLUTION INTRAVENOUS; SUBCUTANEOUS at 17:03
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
## 2018-10-05 NOTE — PROGRESS NOTES
Problem: Falls - Risk of 
Goal: *Absence of Falls Document Rosita Deal Fall Risk and appropriate interventions in the flowsheet. Outcome: Progressing Towards Goal 
Fall Risk Interventions: 
Mobility Interventions: OT consult for ADLs, Patient to call before getting OOB, PT Consult for mobility concerns, PT Consult for assist device competence Mentation Interventions: Adequate sleep, hydration, pain control, Door open when patient unattended, Increase mobility, More frequent rounding, Toileting rounds Medication Interventions: Patient to call before getting OOB, Teach patient to arise slowly Elimination Interventions: Call light in reach, Patient to call for help with toileting needs, Toilet paper/wipes in reach, Toileting schedule/hourly rounds History of Falls Interventions: Room close to nurse's station, Door open when patient unattended
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
## 2018-10-05 NOTE — PROGRESS NOTES
Bedside shift change report given to Lucio Sam RN (oncoming nurse) by Geronimo Pritchard RN (offgoing nurse). Report included the following information SBAR, Kardex, Intake/Output, MAR, Recent Results and Cardiac Rhythm NSR.
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
## 2018-10-05 NOTE — PROGRESS NOTES
[id number] Paged Dr. Cookie Jackson per patient complaints of new onset numbness and tingling in right lower extremity. Patient potentially withdrawing from [facility]. Dr. Cookie Jackson will look at her chart and decide if there will be any new medications added. No new orders at this time. Will continue to monitor.

## 2018-10-05 NOTE — CONSULTS
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 have reviewed admission (and previous/old) labs and medical tests in the EHR and or transferring hospital documents. I will continue to order blood tests/labs and diagnostic tests as deemed appropriate and review results as they become available (see orders for details). I have reviewed old psychiatric and medical records available in the EHR. I Will order additional psychiatric records from other institutions to further elucidate the nature of patient's psychopathology and review once available. I will gather additional collateral information from friends, family and o/p treatment team to further elucidate the nature of patient's psychopathology and baselline level of psychiatric functioning.                      SIGNED:    Brain Graf MD  10/5/2018

## 2018-10-06 ENCOUNTER — APPOINTMENT (OUTPATIENT)
Dept: CT IMAGING | Age: 38
DRG: 071 | End: 2018-10-06
Attending: HOSPITALIST
Payer: SELF-PAY

## 2018-10-06 LAB
GLUCOSE BLD STRIP.AUTO-MCNC: 134 MG/DL (ref 65–100)
GLUCOSE BLD STRIP.AUTO-MCNC: 180 MG/DL (ref 65–100)
GLUCOSE BLD STRIP.AUTO-MCNC: 186 MG/DL (ref 65–100)
GLUCOSE BLD STRIP.AUTO-MCNC: 247 MG/DL (ref 65–100)
SERVICE CMNT-IMP: ABNORMAL

## 2018-10-06 PROCEDURE — 65660000000 HC RM CCU STEPDOWN

## 2018-10-06 PROCEDURE — 74011250637 HC RX REV CODE- 250/637: Performed by: HOSPITALIST

## 2018-10-06 PROCEDURE — 74011250636 HC RX REV CODE- 250/636: Performed by: HOSPITALIST

## 2018-10-06 PROCEDURE — 74011636637 HC RX REV CODE- 636/637: Performed by: HOSPITALIST

## 2018-10-06 PROCEDURE — 82962 GLUCOSE BLOOD TEST: CPT

## 2018-10-06 PROCEDURE — 94760 N-INVAS EAR/PLS OXIMETRY 1: CPT

## 2018-10-06 PROCEDURE — 72131 CT LUMBAR SPINE W/O DYE: CPT

## 2018-10-06 RX ORDER — TRAZODONE HYDROCHLORIDE 50 MG/1
50 TABLET ORAL
Status: DISCONTINUED | OUTPATIENT
Start: 2018-10-06 | End: 2018-10-09 | Stop reason: HOSPADM

## 2018-10-06 RX ORDER — METHADONE HYDROCHLORIDE 10 MG/1
15 TABLET ORAL DAILY
Status: COMPLETED | OUTPATIENT
Start: 2018-10-07 | End: 2018-10-08

## 2018-10-06 RX ORDER — METHADONE HYDROCHLORIDE 10 MG/1
30 TABLET ORAL ONCE
Status: COMPLETED | OUTPATIENT
Start: 2018-10-06 | End: 2018-10-06

## 2018-10-06 RX ADMIN — Medication 10 ML: at 21:42

## 2018-10-06 RX ADMIN — Medication 10 ML: at 07:24

## 2018-10-06 RX ADMIN — INSULIN LISPRO 2 UNITS: 100 INJECTION, SOLUTION INTRAVENOUS; SUBCUTANEOUS at 08:34

## 2018-10-06 RX ADMIN — TRAZODONE HYDROCHLORIDE 50 MG: 50 TABLET ORAL at 21:41

## 2018-10-06 RX ADMIN — TRIAMCINOLONE ACETONIDE: 1 OINTMENT TOPICAL at 21:48

## 2018-10-06 RX ADMIN — GABAPENTIN 600 MG: 300 CAPSULE ORAL at 21:40

## 2018-10-06 RX ADMIN — TOPIRAMATE 100 MG: 100 TABLET, FILM COATED ORAL at 08:35

## 2018-10-06 RX ADMIN — TRAMADOL HYDROCHLORIDE 50 MG: 50 TABLET, FILM COATED ORAL at 21:48

## 2018-10-06 RX ADMIN — INSULIN LISPRO 3 UNITS: 100 INJECTION, SOLUTION INTRAVENOUS; SUBCUTANEOUS at 12:42

## 2018-10-06 RX ADMIN — GABAPENTIN 600 MG: 300 CAPSULE ORAL at 08:35

## 2018-10-06 RX ADMIN — TOPIRAMATE 100 MG: 100 TABLET, FILM COATED ORAL at 17:25

## 2018-10-06 RX ADMIN — Medication 10 ML: at 14:53

## 2018-10-06 RX ADMIN — METHADONE HYDROCHLORIDE 30 MG: 10 TABLET ORAL at 15:55

## 2018-10-06 RX ADMIN — DULOXETINE HYDROCHLORIDE 90 MG: 60 CAPSULE, DELAYED RELEASE ORAL at 10:40

## 2018-10-06 RX ADMIN — QUETIAPINE FUMARATE 50 MG: 25 TABLET ORAL at 21:41

## 2018-10-06 RX ADMIN — INSULIN GLARGINE 20 UNITS: 100 INJECTION, SOLUTION SUBCUTANEOUS at 21:41

## 2018-10-06 RX ADMIN — TRAMADOL HYDROCHLORIDE 50 MG: 50 TABLET, FILM COATED ORAL at 03:45

## 2018-10-06 RX ADMIN — ENOXAPARIN SODIUM 40 MG: 40 INJECTION SUBCUTANEOUS at 17:25

## 2018-10-06 RX ADMIN — TRIAMCINOLONE ACETONIDE: 1 OINTMENT TOPICAL at 08:35

## 2018-10-06 RX ADMIN — INSULIN LISPRO 2 UNITS: 100 INJECTION, SOLUTION INTRAVENOUS; SUBCUTANEOUS at 17:25

## 2018-10-06 RX ADMIN — GABAPENTIN 600 MG: 300 CAPSULE ORAL at 15:55
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
## 2018-10-06 NOTE — PROGRESS NOTES
Bedside and Verbal shift change report given to [facility] (oncoming nurse) by Dorita Arboleda RN (offgoing nurse). Report included the following information SBAR, Kardex, Intake/Output, MAR, Recent Results and Cardiac Rhythm NSR.

## 2018-10-06 NOTE — ROUTINE PROCESS
Bedside and Verbal shift change report given to FEROZ Westfall (oncoming nurse) by Sean Darden (offgoing nurse). Report included the following information SBAR, Kardex, Recent Results and Cardiac Rhythm SR-ST.

## 2018-10-06 NOTE — PROGRESS NOTES
Problem: Falls - Risk of 
Goal: *Absence of Falls Document Rosita Deal Fall Risk and appropriate interventions in the flowsheet. Outcome: Progressing Towards Goal 
Fall Risk Interventions: 
Mobility Interventions: Patient to call before getting OOB, OT consult for ADLs, PT Consult for mobility concerns, Utilize walker, cane, or other assistive device Mentation Interventions: Adequate sleep, hydration, pain control, Update white board, Toileting rounds Medication Interventions: Patient to call before getting OOB, Teach patient to arise slowly Elimination Interventions: Call light in reach, Patient to call for help with toileting needs, Toileting schedule/hourly rounds History of Falls Interventions: Room close to nurse's station, Door open when patient unattended

## 2018-10-07 LAB
GLUCOSE BLD STRIP.AUTO-MCNC: 126 MG/DL (ref 65–100)
GLUCOSE BLD STRIP.AUTO-MCNC: 131 MG/DL (ref 65–100)
GLUCOSE BLD STRIP.AUTO-MCNC: 183 MG/DL (ref 65–100)
GLUCOSE BLD STRIP.AUTO-MCNC: 183 MG/DL (ref 65–100)
SERVICE CMNT-IMP: ABNORMAL

## 2018-10-07 PROCEDURE — 65660000000 HC RM CCU STEPDOWN

## 2018-10-07 PROCEDURE — 74011250636 HC RX REV CODE- 250/636: Performed by: HOSPITALIST

## 2018-10-07 PROCEDURE — 74011250637 HC RX REV CODE- 250/637: Performed by: HOSPITALIST

## 2018-10-07 PROCEDURE — 82962 GLUCOSE BLOOD TEST: CPT

## 2018-10-07 PROCEDURE — 74011636637 HC RX REV CODE- 636/637: Performed by: HOSPITALIST

## 2018-10-07 RX ADMIN — TRAZODONE HYDROCHLORIDE 50 MG: 50 TABLET ORAL at 22:04

## 2018-10-07 RX ADMIN — TOPIRAMATE 100 MG: 100 TABLET, FILM COATED ORAL at 17:01

## 2018-10-07 RX ADMIN — INSULIN GLARGINE 20 UNITS: 100 INJECTION, SOLUTION SUBCUTANEOUS at 22:01

## 2018-10-07 RX ADMIN — TRIAMCINOLONE ACETONIDE: 1 OINTMENT TOPICAL at 17:01

## 2018-10-07 RX ADMIN — METHADONE HYDROCHLORIDE 15 MG: 10 TABLET ORAL at 08:41

## 2018-10-07 RX ADMIN — DULOXETINE HYDROCHLORIDE 90 MG: 60 CAPSULE, DELAYED RELEASE ORAL at 08:41

## 2018-10-07 RX ADMIN — Medication 10 ML: at 22:02

## 2018-10-07 RX ADMIN — GABAPENTIN 600 MG: 300 CAPSULE ORAL at 08:41

## 2018-10-07 RX ADMIN — TRAMADOL HYDROCHLORIDE 50 MG: 50 TABLET, FILM COATED ORAL at 22:13

## 2018-10-07 RX ADMIN — TOPIRAMATE 100 MG: 100 TABLET, FILM COATED ORAL at 08:41

## 2018-10-07 RX ADMIN — TRIAMCINOLONE ACETONIDE: 1 OINTMENT TOPICAL at 08:42

## 2018-10-07 RX ADMIN — ACETAMINOPHEN 650 MG: 325 TABLET ORAL at 12:11

## 2018-10-07 RX ADMIN — GABAPENTIN 600 MG: 300 CAPSULE ORAL at 22:01

## 2018-10-07 RX ADMIN — ENOXAPARIN SODIUM 40 MG: 40 INJECTION SUBCUTANEOUS at 16:58

## 2018-10-07 RX ADMIN — Medication 10 ML: at 07:00

## 2018-10-07 RX ADMIN — QUETIAPINE FUMARATE 50 MG: 25 TABLET ORAL at 22:01

## 2018-10-07 RX ADMIN — INSULIN LISPRO 2 UNITS: 100 INJECTION, SOLUTION INTRAVENOUS; SUBCUTANEOUS at 12:12

## 2018-10-07 RX ADMIN — Medication 10 ML: at 16:58

## 2018-10-07 RX ADMIN — GABAPENTIN 600 MG: 300 CAPSULE ORAL at 16:59

## 2018-10-07 RX ADMIN — ACETAMINOPHEN 650 MG: 325 TABLET ORAL at 18:08

## 2018-10-07 NOTE — PROGRESS NOTES
Problem: Falls - Risk of 
Goal: *Absence of Falls Document Brandon Bryant Fall Risk and appropriate interventions in the flowsheet. Outcome: Progressing Towards Goal 
Fall Risk Interventions: 
Mobility Interventions: Patient to call before getting OOB, Communicate number of staff needed for ambulation/transfer, Strengthening exercises (ROM-active/passive) Mentation Interventions: Update white board, Toileting rounds Medication Interventions: Patient to call before getting OOB, Teach patient to arise slowly Elimination Interventions: Call light in reach, Patient to call for help with toileting needs, Toileting schedule/hourly rounds History of Falls Interventions: Door open when patient unattended, Investigate reason for fall

## 2018-10-07 NOTE — ROUTINE PROCESS
Bedside and Verbal shift change report given to FEROZ Westfall (oncoming nurse) by Bobbi Miller (offgoing nurse).  Report included the following information SBAR, Kardex and Cardiac Rhythm SR.

## 2018-10-07 NOTE — PROGRESS NOTES
Problem: Falls - Risk of 
Goal: *Absence of Falls Document Lexus Baumann Fall Risk and appropriate interventions in the flowsheet. Outcome: Progressing Towards Goal 
Fall Risk Interventions: 
Mobility Interventions: OT consult for ADLs, Patient to call before getting OOB, PT Consult for mobility concerns, PT Consult for assist device competence, Utilize gait belt for transfers/ambulation Mentation Interventions: Adequate sleep, hydration, pain control, Door open when patient unattended, Increase mobility, More frequent rounding, Toileting rounds, Update white board Medication Interventions: Patient to call before getting OOB, Teach patient to arise slowly Elimination Interventions: Call light in reach, Patient to call for help with toileting needs, Toileting schedule/hourly rounds History of Falls Interventions: Door open when patient unattended, Utilize gait belt for transfer/ambulation

## 2018-10-07 NOTE — PROGRESS NOTES
Spiritual Care Partner Volunteer visited patient in [address] [place] [address] on 10/7/2018. Documented by: 
Chaplain Neff MDiv, MS, [place] [id number] PRA ([id number])

## 2018-10-07 NOTE — PROGRESS NOTES
Problem: Falls - Risk of 
Goal: *Absence of Falls Document Sushil Webber Fall Risk and appropriate interventions in the flowsheet. Outcome: Progressing Towards Goal 
Fall Risk Interventions: 
Mobility Interventions: Patient to call before getting OOB, Communicate number of staff needed for ambulation/transfer, Strengthening exercises (ROM-active/passive) Mentation Interventions: Update white board, Toileting rounds Medication Interventions: Patient to call before getting OOB, Teach patient to arise slowly Elimination Interventions: Call light in reach, Patient to call for help with toileting needs, Toileting schedule/hourly rounds History of Falls Interventions: Door open when patient unattended, Investigate reason for fall

## 2018-10-07 NOTE — ROUTINE PROCESS
Bedside and Verbal shift change report given to [address] [facility] (oncoming nurse) by Marycarmen Lowe RN (offgoing nurse). Report included the following information SBAR, Kardex, Intake/Output, MAR, Recent Results and Cardiac Rhythm NSR.
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
## 2018-10-08 LAB
GLUCOSE BLD STRIP.AUTO-MCNC: 129 MG/DL (ref 65–100)
GLUCOSE BLD STRIP.AUTO-MCNC: 132 MG/DL (ref 65–100)
GLUCOSE BLD STRIP.AUTO-MCNC: 165 MG/DL (ref 65–100)
GLUCOSE BLD STRIP.AUTO-MCNC: 184 MG/DL (ref 65–100)
SERVICE CMNT-IMP: ABNORMAL

## 2018-10-08 PROCEDURE — 74011636637 HC RX REV CODE- 636/637: Performed by: HOSPITALIST

## 2018-10-08 PROCEDURE — 65660000000 HC RM CCU STEPDOWN

## 2018-10-08 PROCEDURE — 74011250636 HC RX REV CODE- 250/636: Performed by: HOSPITALIST

## 2018-10-08 PROCEDURE — 82962 GLUCOSE BLOOD TEST: CPT

## 2018-10-08 PROCEDURE — 94760 N-INVAS EAR/PLS OXIMETRY 1: CPT

## 2018-10-08 PROCEDURE — 74011250637 HC RX REV CODE- 250/637: Performed by: HOSPITALIST

## 2018-10-08 PROCEDURE — 97116 GAIT TRAINING THERAPY: CPT

## 2018-10-08 PROCEDURE — 97530 THERAPEUTIC ACTIVITIES: CPT

## 2018-10-08 RX ORDER — TRAMADOL HYDROCHLORIDE 50 MG/1
50 TABLET ORAL
Qty: 5 TAB | Refills: 0 | Status: SHIPPED | OUTPATIENT
Start: 2018-10-08

## 2018-10-08 RX ADMIN — Medication 10 ML: at 21:39

## 2018-10-08 RX ADMIN — Medication 10 ML: at 05:43

## 2018-10-08 RX ADMIN — GABAPENTIN 600 MG: 300 CAPSULE ORAL at 21:38

## 2018-10-08 RX ADMIN — METHADONE HYDROCHLORIDE 15 MG: 10 TABLET ORAL at 09:08

## 2018-10-08 RX ADMIN — Medication 10 ML: at 14:32

## 2018-10-08 RX ADMIN — TRAMADOL HYDROCHLORIDE 50 MG: 50 TABLET, FILM COATED ORAL at 21:38

## 2018-10-08 RX ADMIN — INSULIN GLARGINE 20 UNITS: 100 INJECTION, SOLUTION SUBCUTANEOUS at 22:21

## 2018-10-08 RX ADMIN — QUETIAPINE FUMARATE 50 MG: 25 TABLET ORAL at 21:38

## 2018-10-08 RX ADMIN — DULOXETINE HYDROCHLORIDE 90 MG: 60 CAPSULE, DELAYED RELEASE ORAL at 09:09

## 2018-10-08 RX ADMIN — TRAMADOL HYDROCHLORIDE 50 MG: 50 TABLET, FILM COATED ORAL at 14:36

## 2018-10-08 RX ADMIN — INSULIN LISPRO 2 UNITS: 100 INJECTION, SOLUTION INTRAVENOUS; SUBCUTANEOUS at 11:58

## 2018-10-08 RX ADMIN — TRIAMCINOLONE ACETONIDE: 1 OINTMENT TOPICAL at 09:09

## 2018-10-08 RX ADMIN — INSULIN LISPRO 2 UNITS: 100 INJECTION, SOLUTION INTRAVENOUS; SUBCUTANEOUS at 17:04

## 2018-10-08 RX ADMIN — TRIAMCINOLONE ACETONIDE: 1 OINTMENT TOPICAL at 19:06

## 2018-10-08 RX ADMIN — TRAZODONE HYDROCHLORIDE 50 MG: 50 TABLET ORAL at 21:38

## 2018-10-08 RX ADMIN — TOPIRAMATE 100 MG: 100 TABLET, FILM COATED ORAL at 19:06

## 2018-10-08 RX ADMIN — GABAPENTIN 600 MG: 300 CAPSULE ORAL at 09:09

## 2018-10-08 RX ADMIN — TOPIRAMATE 100 MG: 100 TABLET, FILM COATED ORAL at 09:09

## 2018-10-08 RX ADMIN — GABAPENTIN 600 MG: 300 CAPSULE ORAL at 16:06

## 2018-10-08 RX ADMIN — ENOXAPARIN SODIUM 40 MG: 40 INJECTION SUBCUTANEOUS at 16:06

## 2018-10-08 NOTE — DIABETES MGMT
DTC Progress Note Recommendations/ Comments:Chart reviewed on Malathi Romero for hyperglycemia. If appropriate, please consider: - Adding Januvia 50 mg daily Current hospital DM medication: Lantus 20 units, Lispro correction, normal sensitivity Patient is a 42 y.o. female with a history of Type 2 Diabetes on oral agents (dual therapy): combination: Janumet, insulin injections: Lantus : 20 units and Tanzeum at home. A1c:  
Lab Results Component Value Date/Time Hemoglobin A1c 7.9 (H) 10/04/2018 03:58 AM  
 
 
Recent Glucose Results:  
Lab Results Component Value Date/Time GLUCPOC 184 (H) 10/08/2018 11:39 AM  
 GLUCPOC 129 (H) 10/08/2018 07:16 AM  
 GLUCPOC 183 (H) 10/07/2018 09:16 PM  
  
 
Lab Results Component Value Date/Time Creatinine 0.81 10/04/2018 03:58 AM  
 
Estimated Creatinine Clearance: 101.3 mL/min (based on Cr of 0.81). Active Orders Diet DIET DIABETIC CONSISTENT CARB Regular PO intake: No data found. Will continue to follow as needed. Thank you Melania Sims, MS, RN, CDE

## 2018-10-08 NOTE — ROUTINE PROCESS
Bedside and Verbal shift change report given to Kwaku Antonio (oncoming nurse) by Marycarmen Lowe RN (offgoing nurse). Report included the following information SBAR, Kardex, Intake/Output, MAR, Recent Results and Cardiac Rhythm NSR.
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
## 2018-10-08 NOTE — PROGRESS NOTES
Problem: Falls - Risk of 
Goal: *Absence of Falls Document Javier Brown Fall Risk and appropriate interventions in the flowsheet. Outcome: Progressing Towards Goal 
Fall Risk Interventions: 
Mobility Interventions: Communicate number of staff needed for ambulation/transfer Mentation Interventions: Adequate sleep, hydration, pain control, Increase mobility Medication Interventions: Assess postural VS orthostatic hypotension, Patient to call before getting OOB Elimination Interventions: Call light in reach, Patient to call for help with toileting needs, Toileting schedule/hourly rounds History of Falls Interventions: Door open when patient unattended, Investigate reason for fall

## 2018-10-08 NOTE — PROGRESS NOTES
Bedside RN performed patient education and medication education. Discharge concerns initiated and discussed with patient, including clarification on \"who\" assists the patient at their home and instructions for when the home going patient should call their provider after discharge. Opportunity for questions and clarification was provided. Patient receptive to education: YES Patient stated: I'm ready Barriers to Education: n/a Diagnosis Education given:  NO Length of stay: 5 Expected Day of Discharge: 5 Ask if they have \"Help at Home\" & add to white board? YES Education Day #: 5 Medication Education Given:  YES 
M in the box Medication name: tramadol Pt aware of HCAHPS survey: YES

## 2018-10-08 NOTE — PROGRESS NOTES
Spiritual Care Partner Volunteer visited patient in [address] [place] [address] on 10/8/2018. Documented by: 
Chaplain Chew MDiv, MS, [address] ([id number])

## 2018-10-08 NOTE — PROGRESS NOTES
Bedside and Verbal shift change report given to Blu Franco RN (oncoming nurse) by Stephane Liz RN (offgoing nurse). Report included the following information SBAR, Kardex, ED Summary, Procedure Summary, Intake/Output, MAR, Accordion and Recent Results.

## 2018-10-08 NOTE — ROUTINE PROCESS
Bedside shift change report given to Kwaku Antonio (oncoming nurse) by Nimo Christianson RN (offgoing nurse). Report included the following information SBAR, Kardex, ED Summary, Procedure Summary, Intake/Output, MAR, Accordion, Recent Results and Cardiac Rhythm NSR.
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
## 2018-10-08 NOTE — PROGRESS NOTES
Problem: Falls - Risk of 
Goal: *Absence of Falls Document Leandra Hines Fall Risk and appropriate interventions in the flowsheet. Outcome: Progressing Towards Goal 
Fall Risk Interventions: 
Mobility Interventions: Communicate number of staff needed for ambulation/transfer, OT consult for ADLs, Patient to call before getting OOB, PT Consult for mobility concerns, PT Consult for assist device competence, Strengthening exercises (ROM-active/passive) Mentation Interventions: Adequate sleep, hydration, pain control, Door open when patient unattended, Evaluate medications/consider consulting pharmacy, Eyeglasses and hearing aids, Increase mobility, More frequent rounding, Reorient patient, Room close to nurse's station, Toileting rounds, Update white board Medication Interventions: Evaluate medications/consider consulting pharmacy, Patient to call before getting OOB, Teach patient to arise slowly Elimination Interventions: Call light in reach, Elevated toilet seat, Patient to call for help with toileting needs, Toilet paper/wipes in reach, Toileting schedule/hourly rounds History of Falls Interventions: Consult care management for discharge planning, Door open when patient unattended, Evaluate medications/consider consulting pharmacy, Investigate reason for fall, Room close to nurse's station

## 2018-10-08 NOTE — PROGRESS NOTES
Discharge orders placed. RN prepared discharge prescriptions and paperwork. Unit secretary then informed RN that patient family members, who were supposed to take patient to their home, had left and were \"not going to take her. \" RN spoke with patient, who stated that her child did not \"want anything to do with me because they thought I overdosed. \" RN informed Dr. Juliano Farr, who said to discharge tomorrow when case management can assist.
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
## 2018-10-09 VITALS
RESPIRATION RATE: 14 BRPM | TEMPERATURE: 97.9 F | SYSTOLIC BLOOD PRESSURE: 114 MMHG | HEIGHT: 65 IN | DIASTOLIC BLOOD PRESSURE: 73 MMHG | WEIGHT: 190.2 LBS | OXYGEN SATURATION: 99 % | HEART RATE: 84 BPM | BODY MASS INDEX: 31.69 KG/M2

## 2018-10-09 LAB
GLUCOSE BLD STRIP.AUTO-MCNC: 141 MG/DL (ref 65–100)
GLUCOSE BLD STRIP.AUTO-MCNC: 191 MG/DL (ref 65–100)
SERVICE CMNT-IMP: ABNORMAL
SERVICE CMNT-IMP: ABNORMAL

## 2018-10-09 PROCEDURE — 94760 N-INVAS EAR/PLS OXIMETRY 1: CPT

## 2018-10-09 PROCEDURE — 82962 GLUCOSE BLOOD TEST: CPT

## 2018-10-09 PROCEDURE — 74011250637 HC RX REV CODE- 250/637: Performed by: HOSPITALIST

## 2018-10-09 PROCEDURE — 74011636637 HC RX REV CODE- 636/637: Performed by: HOSPITALIST

## 2018-10-09 RX ADMIN — Medication 10 ML: at 06:00

## 2018-10-09 RX ADMIN — INSULIN LISPRO 2 UNITS: 100 INJECTION, SOLUTION INTRAVENOUS; SUBCUTANEOUS at 11:48

## 2018-10-09 RX ADMIN — INSULIN LISPRO 2 UNITS: 100 INJECTION, SOLUTION INTRAVENOUS; SUBCUTANEOUS at 08:57

## 2018-10-09 RX ADMIN — TRIAMCINOLONE ACETONIDE: 1 OINTMENT TOPICAL at 14:17

## 2018-10-09 RX ADMIN — Medication 10 ML: at 14:12

## 2018-10-09 RX ADMIN — ACETAMINOPHEN 650 MG: 325 TABLET ORAL at 09:02

## 2018-10-09 RX ADMIN — TOPIRAMATE 100 MG: 100 TABLET, FILM COATED ORAL at 08:58

## 2018-10-09 RX ADMIN — DULOXETINE HYDROCHLORIDE 90 MG: 60 CAPSULE, DELAYED RELEASE ORAL at 08:58

## 2018-10-09 RX ADMIN — GABAPENTIN 600 MG: 300 CAPSULE ORAL at 08:57

## 2018-10-09 RX ADMIN — TRAMADOL HYDROCHLORIDE 50 MG: 50 TABLET, FILM COATED ORAL at 11:55

## 2018-10-09 NOTE — PROGRESS NOTES
Problem: Pressure Injury - Risk of 
Goal: *Prevention of pressure injury Document Carroll Scale and appropriate interventions in the flowsheet. Outcome: Progressing Towards Goal 
Pressure Injury Interventions: 
Sensory Interventions: Discuss PT/OT consult with provider, Assess changes in LOC, Assess need for specialty bed Moisture Interventions: Minimize layers Activity Interventions: Increase time out of bed, Pressure redistribution bed/mattress(bed type), PT/OT evaluation Mobility Interventions: Pressure redistribution bed/mattress (bed type), PT/OT evaluation Nutrition Interventions: Document food/fluid/supplement intake Comments: Problem: Pressure Injury - Risk of 
Goal: *Prevention of pressure injury Document Carroll Scale and appropriate interventions in the flowsheet.   
Outcome: Progressing Towards Goal 
Pressure Injury Interventions: 
Sensory Interventions: Discuss PT/OT consult with provider, Assess changes in LOC, Assess need for specialty bed 
  
Moisture Interventions: Minimize layers 
  
Activity Interventions: Increase time out of bed, Pressure redistribution bed/mattress(bed type), PT/OT evaluation 
  
Mobility Interventions: Pressure redistribution bed/mattress (bed type), PT/OT evaluation 
  
Nutrition Interventions: Document food/fluid/supplement intake

## 2018-10-09 NOTE — PROGRESS NOTES
Attempted twice to schedule PCP appointment. Advised patient to schedule. Added to AVS. Ade Rizo CM Specialist

## 2018-10-09 NOTE — PROGRESS NOTES
Problem: Pressure Injury - Risk of 
Goal: *Prevention of pressure injury Document Carroll Scale and appropriate interventions in the flowsheet. Outcome: Progressing Towards Goal 
Pressure Injury Interventions: 
Sensory Interventions: Pressure redistribution bed/mattress (bed type), Minimize linen layers Moisture Interventions: Minimize layers Activity Interventions: PT/OT evaluation Mobility Interventions: Assess need for specialty bed, PT/OT evaluation Nutrition Interventions: Document food/fluid/supplement intake, Discuss nutritional consult with provider

## 2018-10-09 NOTE — ROUTINE PROCESS
Bedside RN performed patient education and medication education. Discharge concerns initiated and discussed with patient, including clarification on \"who\" assists the patient at their home and instructions for when the home going patient should call their provider after discharge. Opportunity for questions and clarification was provided. Patient receptive to education: YES Patient stated: I understand Barriers to Education: none Diagnosis Education given:  YES Length of stay: 6 Expected Day of Discharge: 6 Ask if they have \"Help at Home\" & add to white board? YES Education Day #: 6 Medication Education Given:  NO 
M in the box Medication name:   
 
Pt aware of HCAHPS survey: YES I have reviewed discharge instructions with the patient. The patient verbalized understanding.

## 2018-10-09 NOTE — PROGRESS NOTES
Occupational therapy [id number] -  
10.9.2018 Chart reviewed in prep for OT session. Noted patient with D/C time appx 4-5PM. Attempted to see patient to assist with ADLs prior to D/C. RN informed this therapist patient had already D/C'd. Will complete orders, thank you. Олег Arvizu MS, OTR/L

## 2018-10-09 NOTE — PROGRESS NOTES
Bedside and Verbal shift change report given to Yola Olson RN (oncoming nurse) by Kyra Thompson RN (offgoing nurse). Report included the following information SBAR, Kardex, ED Summary, Procedure Summary, Intake/Output, MAR, Accordion and Recent Results.

## 2018-10-09 NOTE — PROGRESS NOTES
Checked on patient. She was discharged yesterday. She said she did not leave because her daughter had left earlier and she had no ride. She is now awaiitng for hwer daughter to pick her up. She has no new issues or concerns.  
 
Hector Salguero MD

## 2018-10-09 NOTE — PROGRESS NOTES
The CM met with patient at bedside to discuss disposition- the patient is stating this morning she plans to discharge to her daughter Reji Kirkland home in [place], [place]- address on facesheet is her daughter's address. The patient endorses that her daughter will come get her. The CM told the patient that if her daughter cannot transport, to let CM know and we will assist with cab if needed. CM will continue to follow- patient has walker given as eliot by the physical therapy team. Patient has also been provided with numerous resources at bedside for emergency assistance. VIPUL Vera 
 
8:26 a.m.-  Patient gave permission for CM to call daughter, RN also present, and CM called the patient's daughter Adam Burns who also endorsed the plan was for the patient's mother to come to her home today- Adam Burns will be at the hospital today between 4:00-5:00 p.m. To pick-up her mom. VIPUL Vera Only new medication is Tramadol (this is a pain reliever)- CM went to [employer] and found coupon for $9.99 for medication at [address] [place] [address] Prem met with patient at bedside and she verbalized she could afford this medication. Coupon given to RN to place on chart- patient aware to go to [facility].  VIPUL Vera

## 2018-10-09 NOTE — PROGRESS NOTES
Physical Therapy Note 
10/9/18 Chart reviewed, note pt pending discharge home with family today. Cleared for mobility by RN. Received pt supine in bed - reports has been up ad rin to/from bathroom with RW with no mobility concerns and is declining participation in further ambulation, stair negotiation, or there ex at this time. Requesting to rest. Will defer per pt request and continue to follow if remains admitted.  
 
Isha Cantu, PT, DPT
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
